# Patient Record
Sex: FEMALE | Race: WHITE | NOT HISPANIC OR LATINO | Employment: OTHER | URBAN - METROPOLITAN AREA
[De-identification: names, ages, dates, MRNs, and addresses within clinical notes are randomized per-mention and may not be internally consistent; named-entity substitution may affect disease eponyms.]

---

## 2019-07-25 ENCOUNTER — TELEPHONE (OUTPATIENT)
Dept: OBSTETRICS AND GYNECOLOGY | Facility: CLINIC | Age: 64
End: 2019-07-25

## 2019-07-25 NOTE — TELEPHONE ENCOUNTER
----- Message from Sandra Kilpatrick sent at 7/25/2019  3:38 PM CDT -----  Contact: pt  Pt needs to schedule appt (abnormal cells )... [phone] (home)

## 2019-07-25 NOTE — TELEPHONE ENCOUNTER
Spoke to patient and scheduled her appointment for 07/30/19 at 2:30pm to see Dr. VINCE Casarez at the [facility] location. Patient verbalized understanding.

## 2019-07-30 ENCOUNTER — OFFICE VISIT (OUTPATIENT)
Dept: OBSTETRICS AND GYNECOLOGY | Facility: CLINIC | Age: 64
End: 2019-07-30
Payer: COMMERCIAL

## 2019-07-30 ENCOUNTER — TELEPHONE (OUTPATIENT)
Dept: OBSTETRICS AND GYNECOLOGY | Facility: CLINIC | Age: 64
End: 2019-07-30

## 2019-07-30 VITALS
HEIGHT: 67 IN | WEIGHT: 131.38 LBS | SYSTOLIC BLOOD PRESSURE: 152 MMHG | BODY MASS INDEX: 20.62 KG/M2 | DIASTOLIC BLOOD PRESSURE: 76 MMHG

## 2019-07-30 DIAGNOSIS — N95.2 VAGINAL ATROPHY: ICD-10-CM

## 2019-07-30 DIAGNOSIS — R87.612 LOW GRADE SQUAMOUS INTRAEPITHELIAL LESION (LGSIL) ON CERVICAL PAP SMEAR: Primary | ICD-10-CM

## 2019-07-30 DIAGNOSIS — Z78.0 MENOPAUSE: ICD-10-CM

## 2019-07-30 PROCEDURE — 3288F PR FALLS RISK ASSESSMENT DOCUMENTED: ICD-10-PCS | Mod: CPTII,S$GLB,, | Performed by: OBSTETRICS & GYNECOLOGY

## 2019-07-30 PROCEDURE — 99204 OFFICE O/P NEW MOD 45 MIN: CPT | Mod: S$GLB,,, | Performed by: OBSTETRICS & GYNECOLOGY

## 2019-07-30 PROCEDURE — 3008F BODY MASS INDEX DOCD: CPT | Mod: CPTII,S$GLB,, | Performed by: OBSTETRICS & GYNECOLOGY

## 2019-07-30 PROCEDURE — 99999 PR PBB SHADOW E&M-EST. PATIENT-LVL III: CPT | Mod: PBBFAC,,, | Performed by: OBSTETRICS & GYNECOLOGY

## 2019-07-30 PROCEDURE — 3008F PR BODY MASS INDEX (BMI) DOCUMENTED: ICD-10-PCS | Mod: CPTII,S$GLB,, | Performed by: OBSTETRICS & GYNECOLOGY

## 2019-07-30 PROCEDURE — 1100F PTFALLS ASSESS-DOCD GE2>/YR: CPT | Mod: CPTII,S$GLB,, | Performed by: OBSTETRICS & GYNECOLOGY

## 2019-07-30 PROCEDURE — 3288F FALL RISK ASSESSMENT DOCD: CPT | Mod: CPTII,S$GLB,, | Performed by: OBSTETRICS & GYNECOLOGY

## 2019-07-30 PROCEDURE — 99204 PR OFFICE/OUTPT VISIT, NEW, LEVL IV, 45-59 MIN: ICD-10-PCS | Mod: S$GLB,,, | Performed by: OBSTETRICS & GYNECOLOGY

## 2019-07-30 PROCEDURE — 1100F PR PT FALLS ASSESS DOC 2+ FALLS/FALL W/INJURY/YR: ICD-10-PCS | Mod: CPTII,S$GLB,, | Performed by: OBSTETRICS & GYNECOLOGY

## 2019-07-30 PROCEDURE — 99999 PR PBB SHADOW E&M-EST. PATIENT-LVL III: ICD-10-PCS | Mod: PBBFAC,,, | Performed by: OBSTETRICS & GYNECOLOGY

## 2019-07-30 RX ORDER — CYCLOBENZAPRINE HCL 5 MG
5 TABLET ORAL 3 TIMES DAILY PRN
COMMUNITY

## 2019-07-30 RX ORDER — LIOTHYRONINE SODIUM 5 UG/1
5 TABLET ORAL 3 TIMES DAILY
COMMUNITY

## 2019-07-30 RX ORDER — ACYCLOVIR 400 MG/1
400 TABLET ORAL 2 TIMES DAILY
COMMUNITY

## 2019-07-30 RX ORDER — LEVOTHYROXINE SODIUM 175 UG/1
175 TABLET ORAL DAILY
COMMUNITY

## 2019-07-30 RX ORDER — ESTRADIOL 10 UG/1
1 INSERT VAGINAL
Qty: 12 TABLET | Refills: 11 | Status: SHIPPED | OUTPATIENT
Start: 2019-07-31 | End: 2020-07-30

## 2019-07-30 RX ORDER — CLONAZEPAM 2 MG/1
2 TABLET ORAL 2 TIMES DAILY
COMMUNITY

## 2019-07-30 RX ORDER — IBUPROFEN 400 MG/1
400 TABLET ORAL EVERY 6 HOURS PRN
COMMUNITY

## 2019-07-30 NOTE — TELEPHONE ENCOUNTER
----- Message from AIYANA Casarez MD sent at 7/30/2019 10:23 AM CDT -----  Regarding: appt this pm  Contact to inform we will need the Pap report to discuss any findings  Seems to be from outside [facility].

## 2019-07-30 NOTE — PROGRESS NOTES
Subjective:       Patient ID: Liat Hooper is a 64 y.o. female.    Chief Complaint:  Abnormal Pap Smear      History of Present Illness  HPI  LGSIL pap with negative HPV   History of vaginal atrophy symptoms.  On transdermal estrogen with oral compounded progesterone and testosterone  Reviewed serum levels.    Suggest conversion to vaginal estrogen only.  OK to continue progesterone and testosterone     Discussed pap findings.  LGSIL most likely related to lack of vaginal estrogen effect.  OK with 3 months of vaginal estrogen followed by pap.     Health Maintenance   Topic Date Due    Hepatitis C Screening  1955    Lipid Panel  1955    TETANUS VACCINE  1972    Mammogram  1994    DEXA SCAN  1994    Colonoscopy  2004    Pneumococcal Vaccine (65+ Low/Medium Risk) (1 of 2 - PCV13) 2019    Influenza Vaccine  2019     GYN & OB History  No LMP recorded. Patient is postmenopausal.   Date of Last Pap: No result found    OB History    Para Term  AB Living   2 1 1   1 1   SAB TAB Ectopic Multiple Live Births       1   1      # Outcome Date GA Lbr Yovani/2nd Weight Sex Delivery Anes PTL Lv   2 Ectopic            1 Term 81 40w0d   F CS-Unspec   JOSE E      Complications: Herpes       Review of Systems  Review of Systems        Objective:   Physical Exam     Assessment:        1. Low grade squamous intraepithelial lesion (LGSIL) on cervical Pap smear    2. Vaginal atrophy    3. Menopause                Plan:            Liat was seen today for abnormal pap smear.    Diagnoses and all orders for this visit:    Low grade squamous intraepithelial lesion (LGSIL) on cervical Pap smear  Comments:  HPV negative  Vaginal atrophy noted.   Recommend vaginal estrogen for 3 months, then repeat pap     Vaginal atrophy  -     estradiol (VAGIFEM) 10 mcg Tab; Place 1 tablet (10 mcg total) vaginally 3 (three) times a week.    Menopause

## 2019-08-06 ENCOUNTER — TELEPHONE (OUTPATIENT)
Dept: OBSTETRICS AND GYNECOLOGY | Facility: CLINIC | Age: 64
End: 2019-08-06

## 2019-08-06 NOTE — TELEPHONE ENCOUNTER
----- Message from Dontrell Odom sent at 8/6/2019  4:44 PM CDT -----  Contact:  Call [facility] / Nurse Bossman Bell is calling to speak with  Dr Casarez regarding this patient.       Doctor's Office [phone]  Dr Bell Cell [phone]    Thanks

## 2019-11-22 ENCOUNTER — OFFICE VISIT (OUTPATIENT)
Dept: OBSTETRICS AND GYNECOLOGY | Facility: CLINIC | Age: 64
End: 2019-11-22
Payer: MEDICARE

## 2019-11-22 VITALS
DIASTOLIC BLOOD PRESSURE: 78 MMHG | SYSTOLIC BLOOD PRESSURE: 118 MMHG | HEIGHT: 67 IN | WEIGHT: 130.31 LBS | BODY MASS INDEX: 20.45 KG/M2

## 2019-11-22 DIAGNOSIS — R87.612 LOW GRADE SQUAMOUS INTRAEPITHELIAL LESION (LGSIL) ON CERVICAL PAP SMEAR: Primary | ICD-10-CM

## 2019-11-22 DIAGNOSIS — N95.2 VAGINAL ATROPHY: ICD-10-CM

## 2019-11-22 PROCEDURE — 88175 CYTOPATH C/V AUTO FLUID REDO: CPT

## 2019-11-22 PROCEDURE — 99212 OFFICE O/P EST SF 10 MIN: CPT | Mod: PBBFAC | Performed by: OBSTETRICS & GYNECOLOGY

## 2019-11-22 PROCEDURE — 99213 PR OFFICE/OUTPT VISIT, EST, LEVL III, 20-29 MIN: ICD-10-PCS | Mod: S$PBB,,, | Performed by: OBSTETRICS & GYNECOLOGY

## 2019-11-22 PROCEDURE — 99213 OFFICE O/P EST LOW 20 MIN: CPT | Mod: S$PBB,,, | Performed by: OBSTETRICS & GYNECOLOGY

## 2019-11-22 PROCEDURE — 99999 PR PBB SHADOW E&M-EST. PATIENT-LVL II: CPT | Mod: PBBFAC,,, | Performed by: OBSTETRICS & GYNECOLOGY

## 2019-11-22 PROCEDURE — 99999 PR PBB SHADOW E&M-EST. PATIENT-LVL II: ICD-10-PCS | Mod: PBBFAC,,, | Performed by: OBSTETRICS & GYNECOLOGY

## 2019-11-22 PROCEDURE — 87624 HPV HI-RISK TYP POOLED RSLT: CPT

## 2019-11-22 RX ORDER — IMIQUIMOD 12.5 MG/.25G
CREAM TOPICAL
Refills: 0 | COMMUNITY
Start: 2019-09-24

## 2019-11-22 RX ORDER — LEVOTHYROXINE SODIUM 150 UG/1
TABLET ORAL
Refills: 5 | COMMUNITY
Start: 2019-10-15

## 2019-11-22 NOTE — PROGRESS NOTES
Subjective:       Patient ID: Liat Hooper is a 64 y.o. female.    Chief Complaint:  repeat pap      History of Present Illness  HPI  LGSIL with negative HPV on pap in   Elected to use vaginal estrogen then repeat after 3 months  Started estrogen then stopped for past month because of change of benefits and cost of product.     Health Maintenance   Topic Date Due    Hepatitis C Screening  1955    Lipid Panel  1955    TETANUS VACCINE  1972    Mammogram  1994    DEXA SCAN  1994    Colonoscopy  2004    Pneumococcal Vaccine (65+ Low/Medium Risk) (1 of 2 - PCV13) 2019     GYN & OB History  No LMP recorded. Patient is postmenopausal.   Date of Last Pap: No result found    OB History    Para Term  AB Living   2 1 1   1 1   SAB TAB Ectopic Multiple Live Births       1   1      # Outcome Date GA Lbr Yovani/2nd Weight Sex Delivery Anes PTL Lv   2 Ectopic            1 Term 81 40w0d   F CS-Unspec   JOSE E      Complications: Herpes       Review of Systems  Review of Systems        Objective:   Physical Exam:               Genitourinary: There is no rash, tenderness, lesion or injury on the right labia. There is no rash, tenderness, lesion or injury on the left labia. Cervix is normal. No erythema, tenderness, bleeding, rectocele or cystocele in the vagina. No foreign body in the vagina. No vaginal discharge found. Labial bartholins normal.Additional cervical findings: pap smear done  Genitourinary Comments: Vaginal thinning with thin cervical mucosa,                       Assessment:        1. Low grade squamous intraepithelial lesion (LGSIL) on cervical Pap smear    2. Vaginal atrophy                Plan:            Liat was seen today for repeat pap.    Diagnoses and all orders for this visit:    Low grade squamous intraepithelial lesion (LGSIL) on cervical Pap smear  -     Liquid-Based Pap Smear, Screening  -     HPV High Risk Genotypes,  PCR    Vaginal atrophy    Reconsider estrogen use vaginally  Replens as otc alternative

## 2019-12-02 LAB
HPV HR 12 DNA SPEC QL NAA+PROBE: NEGATIVE
HPV16 AG SPEC QL: NEGATIVE
HPV18 DNA SPEC QL NAA+PROBE: NEGATIVE

## 2019-12-07 LAB
FINAL PATHOLOGIC DIAGNOSIS: NORMAL
Lab: NORMAL